# Patient Record
Sex: FEMALE | Race: WHITE | NOT HISPANIC OR LATINO | Employment: FULL TIME | URBAN - NONMETROPOLITAN AREA
[De-identification: names, ages, dates, MRNs, and addresses within clinical notes are randomized per-mention and may not be internally consistent; named-entity substitution may affect disease eponyms.]

---

## 2017-04-12 ENCOUNTER — OFFICE VISIT (OUTPATIENT)
Dept: CARDIOLOGY | Facility: CLINIC | Age: 32
End: 2017-04-12

## 2017-04-12 VITALS
DIASTOLIC BLOOD PRESSURE: 78 MMHG | HEIGHT: 63 IN | OXYGEN SATURATION: 100 % | WEIGHT: 130.8 LBS | HEART RATE: 107 BPM | BODY MASS INDEX: 23.18 KG/M2 | SYSTOLIC BLOOD PRESSURE: 121 MMHG

## 2017-04-12 DIAGNOSIS — R00.0 TACHYCARDIA: ICD-10-CM

## 2017-04-12 DIAGNOSIS — R42 DIZZINESS: ICD-10-CM

## 2017-04-12 DIAGNOSIS — R06.02 SHORTNESS OF BREATH: Primary | ICD-10-CM

## 2017-04-12 PROCEDURE — 99204 OFFICE O/P NEW MOD 45 MIN: CPT | Performed by: PHYSICIAN ASSISTANT

## 2017-04-12 RX ORDER — NORGESTIMATE AND ETHINYL ESTRADIOL 0.25-0.035
1 KIT ORAL DAILY
COMMUNITY

## 2017-04-12 NOTE — PATIENT INSTRUCTIONS
Postural Orthostatic Tachycardia Syndrome  Postural orthostatic tachycardia syndrome (POTS) is an increased heart rate when going from a lying (supine) position to a standing position. The heart rate may increase more than 30 beats per minute (BPM) above its resting rate when going from a lying to a standing position. POTS occurs more frequently in women than in men.   SYMPTOMS   POTS symptoms may be increased in the morning. Symptoms of POTS include:  · Fainting or near fainting.  · Inability to think clearly.  · Extreme or chronic fatigue.  · Exercise intolerance.  · Chest pain.  · Having the lower legs develop a reddish-blue color due to decreased blood flow (acrocyanosis).  CAUSES  POTS can be caused by different conditions. Sometimes, it has no known cause (idiopathic). Some causes of POTS include:  · Viral illness.  · Pregnancy.  · Autoimmune diseases.  · Medications.  · Major surgery.  · Trauma such as a car accident or major injury.  · Medical conditions such as anemia, dehydration, and hyperthyroidism.  DIAGNOSIS   POTS is diagnosed by:  · Taking a complete history and physical exam.  · Measuring the heart rate while lying and then upon standing.  · Measuring blood pressure when going from a lying to a standing position. POTS is usually not associated with low blood pressure (orthostatic hypotension) when going from a lying to standing position. While standing, blood pressure should be taken 2, 5, and 10 minutes after getting up.  TREATMENT   Treatment of POTS depends upon the severity of the symptoms. Treatment includes:  · Drinking plenty of fluids to avoid getting dehydrated.  · Avoiding very hot environments to not get overheated.  · Increasing your dietary salt intake as instructed by your caregiver.  · Taking different types of medications as prescribed for POTS.  · Avoiding some classes of medications such as vasodilators and diuretics.  SEEK IMMEDIATE MEDICAL CARE IF  · You have severe chest pain  that does not go away. Call your local emergency service immediately.  · You feel your heart racing or beating rapidly.  · You feel like passing out.  · You have very confused thinking.  MAKE SURE YOU  · Understand these instructions.  · Will watch your condition.  · Will get help right away if you are not doing well or get worse.     This information is not intended to replace advice given to you by your health care provider. Make sure you discuss any questions you have with your health care provider.     Document Released: 12/08/2003 Document Revised: 01/08/2016 Document Reviewed: 07/01/2016  Elsevier Interactive Patient Education ©2016 [employer]

## 2017-04-12 NOTE — PROGRESS NOTES
Subjective   Darlene Chanel is a 32 y.o. female     Chief Complaint   Patient presents with   • Palpitations     presents as a new patient    • Edema   • Fatigue       HPI    Patient is a 32-year-old female that presents to our office being referred in the setting of tachycardia, palpitations, and dizziness. She has no history of coronary artery disease, congenital heart disease, or structural heart disease    Patient states that she has been noticing episodes of dizziness and tachycardia when she becomes active. She describes that she will be sitting in upon standing she will occasionally get dizzy as well as having a tachycardic sensation. She has noted her heart rates to jump to 130s to 140s beats per minute. She also describes fatigue, anergy, and weakness.    She does not describe any significant chest pain. She does get mildly dyspneic at times but nothing that has been progressive. She denies PND orthopnea. She denies presyncope or syncope. Otherwise she states she is doing well       Current Outpatient Prescriptions   Medication Sig Dispense Refill   • norgestimate-ethinyl estradiol (SPRINTEC 28) 0.25-35 MG-MCG per tablet Take 1 package by mouth Daily.     • sertraline (ZOLOFT) 50 MG tablet Take 50 mg by mouth Daily.       No current facility-administered medications for this visit.        Review of patient's allergies indicates no known allergies.    History reviewed. No pertinent past medical history.    Social History     Social History   • Marital status:      Spouse name: N/A   • Number of children: N/A   • Years of education: N/A     Occupational History   • Not on file.     Social History Main Topics   • Smoking status: Former Smoker   • Smokeless tobacco: Never Used   • Alcohol use No   • Drug use: No   • Sexual activity: Defer     Other Topics Concern   • Not on file     Social History Narrative   • No narrative on file       @[facility]@    Review of Systems   Constitutional: Positive for  "fatigue.   HENT: Positive for sinus pressure.    Eyes: Negative.    Respiratory: Positive for shortness of breath.    Cardiovascular: Positive for palpitations and leg swelling. Negative for chest pain.   Gastrointestinal: Negative.    Endocrine: Negative.    Genitourinary: Negative.    Musculoskeletal: Negative.    Skin: Negative.    Allergic/Immunologic: Positive for environmental allergies.   Neurological: Positive for light-headedness and headaches.   Hematological: Bruises/bleeds easily.   Psychiatric/Behavioral: The patient is nervous/anxious.        Objective     /78 (BP Location: Left arm, Patient Position: Standing)  Pulse 107  Ht 63\" (160 cm)  Wt 130 lb 12.8 oz (59.3 kg)  SpO2 100%  BMI 23.17 kg/m2    Lab Results (most recent)     None          Physical Exam   Constitutional: She is oriented to person, place, and time. She appears well-developed and well-nourished. No distress.   HENT:   Head: Normocephalic and atraumatic.   Eyes: EOM are normal. Pupils are equal, round, and reactive to light.   Neck: No JVD present.   Cardiovascular: Normal rate, regular rhythm and normal heart sounds.  Exam reveals no gallop and no friction rub.    No murmur heard.  Pulmonary/Chest: Effort normal and breath sounds normal. No respiratory distress. She has no wheezes. She has no rales. She exhibits no tenderness.   Abdominal: Soft.   Musculoskeletal: Normal range of motion. She exhibits no edema.   Neurological: She is alert and oriented to person, place, and time. No cranial nerve deficit.   Skin: Skin is warm and dry. No rash noted. No erythema. No pallor.   Psychiatric: She has a normal mood and affect. Her behavior is normal.   Nursing note and vitals reviewed.      Procedure   Procedures         Assessment/Plan     Problems Addressed this Visit        Cardiovascular and Mediastinum    Tachycardia    Relevant Orders    Treadmill Stress Test    Adult Transthoracic Echo Complete    Holter Monitor - 24 Hour    "    Respiratory    Shortness of breath - Primary    Relevant Orders    Treadmill Stress Test    Adult Transthoracic Echo Complete    Holter Monitor - 24 Hour       Other    Dizziness    Relevant Orders    Treadmill Stress Test    Adult Transthoracic Echo Complete    Holter Monitor - 24 Hour                Recommendationa  1. My initial impression of the patient is that she may have pots syndrome versus inappropriate sinus tachycardia. Orthostatic blood pressures do not suggest a significant drop although she does become more tachycardic. We would like to perform a basic routine cardiac workup. Stress test to look for any arrhythmias as well as his for an ischemia assessment. Echocardiogram to evaluate structural integrity of the heart as well as cardiac function. We will like to obtain a 24-hour Holter monitor to rule out any significant arrhythmias. Upon follow-up, if above is unremarkable, we may consider adding of a low-dose beta-blockade to help with her tachycardic sensations. Otherwise we will see back for follow-up of above testing. Follow-up primary as scheduled

## 2017-04-18 ENCOUNTER — OUTSIDE FACILITY SERVICE (OUTPATIENT)
Dept: CARDIOLOGY | Facility: CLINIC | Age: 32
End: 2017-04-18

## 2017-04-18 PROCEDURE — 93227 XTRNL ECG REC<48 HR R&I: CPT | Performed by: INTERNAL MEDICINE

## 2017-05-04 ENCOUNTER — OUTSIDE FACILITY SERVICE (OUTPATIENT)
Dept: CARDIOLOGY | Facility: CLINIC | Age: 32
End: 2017-05-04

## 2017-05-04 ENCOUNTER — HOSPITAL ENCOUNTER (OUTPATIENT)
Dept: CARDIOLOGY | Facility: HOSPITAL | Age: 32
Discharge: HOME | End: 2017-05-04
Admitting: PHYSICIAN ASSISTANT

## 2017-05-04 ENCOUNTER — HOSPITAL ENCOUNTER (OUTPATIENT)
Dept: CARDIOLOGY | Facility: HOSPITAL | Age: 32
Discharge: HOME | End: 2017-05-04

## 2017-05-04 LAB
MAXIMAL PREDICTED HEART RATE: 190 BPM
STRESS TARGET HR: 162 BPM

## 2017-05-04 PROCEDURE — 93306 TTE W/DOPPLER COMPLETE: CPT | Performed by: INTERNAL MEDICINE

## 2017-05-04 PROCEDURE — 93306 TTE W/DOPPLER COMPLETE: CPT

## 2017-05-04 PROCEDURE — 93018 CV STRESS TEST I&R ONLY: CPT | Performed by: INTERNAL MEDICINE

## 2017-05-04 PROCEDURE — 93017 CV STRESS TEST TRACING ONLY: CPT

## 2017-11-02 ENCOUNTER — OFFICE VISIT (OUTPATIENT)
Dept: CARDIOLOGY | Facility: CLINIC | Age: 32
End: 2017-11-02

## 2017-11-02 VITALS
DIASTOLIC BLOOD PRESSURE: 69 MMHG | OXYGEN SATURATION: 99 % | HEART RATE: 67 BPM | SYSTOLIC BLOOD PRESSURE: 107 MMHG | HEIGHT: 63 IN | BODY MASS INDEX: 23.04 KG/M2 | WEIGHT: 130 LBS

## 2017-11-02 DIAGNOSIS — R00.0 TACHYCARDIA: ICD-10-CM

## 2017-11-02 DIAGNOSIS — G90.A POTS (POSTURAL ORTHOSTATIC TACHYCARDIA SYNDROME): ICD-10-CM

## 2017-11-02 DIAGNOSIS — R06.02 SHORTNESS OF BREATH: Primary | ICD-10-CM

## 2017-11-02 PROCEDURE — 99213 OFFICE O/P EST LOW 20 MIN: CPT | Performed by: PHYSICIAN ASSISTANT

## 2017-11-02 RX ORDER — FLUDROCORTISONE ACETATE 0.1 MG/1
0.1 TABLET ORAL DAILY
Qty: 30 TABLET | Refills: 6 | Status: SHIPPED | OUTPATIENT
Start: 2017-11-02 | End: 2017-11-29 | Stop reason: ALTCHOICE

## 2017-11-02 NOTE — PROGRESS NOTES
Problem list     Subjective   Darlene Chanel is a 32 y.o. female     Chief Complaint   Patient presents with   • Rapid Heart Rate     Here for 2-3 mo. echo f/u   • Dizziness       HPI    Problem list  1.  Perez syndrome  2.  Low risk regular treadmill stress test May 2017  3.  Preserved systolic function  4.  Chronic palpitations and tachycardia  4.1 Holter monitor May 2017 demonstrated tachycardia and occasional PVC.    Patient is a 32-year-old female that presents back for follow-up.  She had routine cardiac testing which was unremarkable.  Stress test demonstrated no evidence of ischemia patient exercising 8 minutes on a Igor protocol.  Echocardiogram was normal.  Holter monitor showed tachycardia with occasional PVC.  She has no chest pain or pressure    Patient's complaint appears to be    chronic fatigue as well as palpitations and tachycardia.She describes that time her heart will beat fast that she will feel fatigued.  She describes lightheadedness upon standing at times as well.  She has been hypotensive the last 2 office visits.    She has minimal dyspnea.  Denies PND orthopnea.  Otherwise voices no complaints      Outpatient Encounter Prescriptions as of 11/2/2017   Medication Sig Dispense Refill   • norgestimate-ethinyl estradiol (SPRINTEC 28) 0.25-35 MG-MCG per tablet Take 1 package by mouth Daily.     • sertraline (ZOLOFT) 50 MG tablet Take 25 mg by mouth Daily.     • fludrocortisone 0.1 MG tablet Take 1 tablet by mouth Daily. 30 tablet 6     No facility-administered encounter medications on file as of 11/2/2017.        Review of patient's allergies indicates no known allergies.    History reviewed. No pertinent past medical history.    Social History     Social History   • Marital status:      Spouse name: N/A   • Number of children: N/A   • Years of education: N/A     Occupational History   • Not on file.     Social History Main Topics   • Smoking status: Former Smoker   • Smokeless tobacco:  "Never Used   • Alcohol use No   • Drug use: No   • Sexual activity: Defer     Other Topics Concern   • Not on file     Social History Narrative       Family History   Problem Relation Age of Onset   • Hypertension Mother    • Clotting disorder Mother    • COPD Father    • Emphysema Father        Review of Systems   Constitutional: Fatigue: worse.   HENT: Negative.    Eyes: Positive for visual disturbance (contacts).   Respiratory: Positive for shortness of breath (with palps).    Cardiovascular: Positive for palpitations and leg swelling. Negative for chest pain.   Gastrointestinal: Negative.    Endocrine: Negative.    Genitourinary: Negative.    Musculoskeletal: Positive for myalgias (fibro.).   Skin: Negative.    Allergic/Immunologic: Negative.    Neurological: Positive for dizziness.   Hematological: Bruises/bleeds easily (bruise).   Psychiatric/Behavioral: Negative.        Objective   Vitals:    11/02/17 1109   BP: 107/69   BP Location: Left arm   Patient Position: Sitting   Pulse: 67   SpO2: 99%   Weight: 130 lb (59 kg)   Height: 63\" (160 cm)      /69 (BP Location: Left arm, Patient Position: Sitting)  Pulse 67  Ht 63\" (160 cm)  Wt 130 lb (59 kg)  SpO2 99%  BMI 23.03 kg/m2    Lab Results (most recent)     None          Physical Exam   Constitutional: She is oriented to person, place, and time. She appears well-developed and well-nourished. No distress.   HENT:   Head: Normocephalic and atraumatic.   Eyes: EOM are normal. Pupils are equal, round, and reactive to light.   Neck: No JVD present.   Cardiovascular: Normal rate, regular rhythm and normal heart sounds.  Exam reveals no gallop and no friction rub.    No murmur heard.  Pulmonary/Chest: Effort normal and breath sounds normal. No respiratory distress. She has no wheezes. She has no rales. She exhibits no tenderness.   Abdominal: Soft.   Musculoskeletal: Normal range of motion. She exhibits no edema.   Neurological: She is alert and oriented to " person, place, and time. No cranial nerve deficit.   Skin: Skin is warm and dry. No rash noted. No erythema. No pallor.   Psychiatric: She has a normal mood and affect. Her behavior is normal.   Nursing note and vitals reviewed.      Procedure   Procedures       Assessment/Plan     Problems Addressed this Visit        Cardiovascular and Mediastinum    Tachycardia    POTS (postural orthostatic tachycardia syndrome)       Respiratory    Shortness of breath - Primary              Recommendation  1.  Patient symptoms seem to be related to pots syndrome.  I would like to start her on Florinef at this time to help improve blood pressure.  I would like to see her back in 3-4 weeks.  We may at some point have to put her on small dose beta blocker to control heart rate.  2.  Otherwise cardiac testing is negative.  We'll see her back for follow-up as scheduled.  Follow-up primary as scheduled

## 2017-11-28 ENCOUNTER — TRANSCRIBE ORDERS (OUTPATIENT)
Dept: CARDIOLOGY | Facility: CLINIC | Age: 32
End: 2017-11-28

## 2017-11-28 DIAGNOSIS — R00.0 TACHYCARDIA: Primary | ICD-10-CM

## 2017-11-29 ENCOUNTER — HOSPITAL ENCOUNTER (OUTPATIENT)
Dept: RESPIRATORY THERAPY | Facility: HOSPITAL | Age: 32
Discharge: HOME | End: 2017-11-29
Attending: INTERNAL MEDICINE | Admitting: INTERNAL MEDICINE

## 2017-11-29 ENCOUNTER — OFFICE VISIT (OUTPATIENT)
Dept: CARDIOLOGY | Facility: CLINIC | Age: 32
End: 2017-11-29

## 2017-11-29 VITALS
HEART RATE: 94 BPM | HEIGHT: 63 IN | WEIGHT: 129 LBS | DIASTOLIC BLOOD PRESSURE: 70 MMHG | SYSTOLIC BLOOD PRESSURE: 122 MMHG | OXYGEN SATURATION: 96 % | BODY MASS INDEX: 22.86 KG/M2

## 2017-11-29 DIAGNOSIS — R00.2 PALPITATIONS: ICD-10-CM

## 2017-11-29 DIAGNOSIS — R07.9 CHEST PAIN IN ADULT: ICD-10-CM

## 2017-11-29 DIAGNOSIS — R00.0 TACHYCARDIA: Primary | ICD-10-CM

## 2017-11-29 PROCEDURE — 99204 OFFICE O/P NEW MOD 45 MIN: CPT | Performed by: INTERNAL MEDICINE

## 2017-11-29 PROCEDURE — 93226 XTRNL ECG REC<48 HR SCAN A/R: CPT

## 2017-11-29 PROCEDURE — 93225 XTRNL ECG REC<48 HRS REC: CPT

## 2017-11-29 RX ORDER — ONDANSETRON 4 MG/1
4 TABLET, FILM COATED ORAL EVERY 8 HOURS PRN
COMMUNITY
End: 2017-11-29 | Stop reason: ALTCHOICE

## 2017-11-29 RX ORDER — TRETINOIN 0.5 MG/G
1 CREAM TOPICAL NIGHTLY
COMMUNITY
End: 2017-11-29

## 2017-11-29 RX ORDER — METOPROLOL TARTRATE 75 MG/1
25 TABLET, FILM COATED ORAL 2 TIMES DAILY
COMMUNITY
End: 2017-12-14

## 2017-11-29 RX ORDER — FUROSEMIDE 20 MG/1
20 TABLET ORAL 2 TIMES DAILY
COMMUNITY

## 2017-12-01 PROCEDURE — 93227 XTRNL ECG REC<48 HR R&I: CPT | Performed by: INTERNAL MEDICINE

## 2017-12-05 ENCOUNTER — TELEPHONE (OUTPATIENT)
Dept: CARDIOLOGY | Facility: CLINIC | Age: 32
End: 2017-12-05

## 2017-12-05 NOTE — TELEPHONE ENCOUNTER
MD Umu Pepper MA                   Holter monitor is good except slightly fast heartbeat.  Keep the appointment.  We will discuss further

## 2017-12-14 ENCOUNTER — OFFICE VISIT (OUTPATIENT)
Dept: CARDIOLOGY | Facility: CLINIC | Age: 32
End: 2017-12-14

## 2017-12-14 VITALS
HEIGHT: 63 IN | OXYGEN SATURATION: 99 % | DIASTOLIC BLOOD PRESSURE: 62 MMHG | SYSTOLIC BLOOD PRESSURE: 118 MMHG | WEIGHT: 132.4 LBS | HEART RATE: 92 BPM | BODY MASS INDEX: 23.46 KG/M2

## 2017-12-14 DIAGNOSIS — R00.2 PALPITATIONS: Primary | ICD-10-CM

## 2017-12-14 DIAGNOSIS — R00.0 TACHYCARDIA: ICD-10-CM

## 2017-12-14 PROCEDURE — 99213 OFFICE O/P EST LOW 20 MIN: CPT | Performed by: INTERNAL MEDICINE

## 2017-12-14 NOTE — PROGRESS NOTES
subjective     Chief Complaint   Patient presents with   • Follow-up   • Rapid Heart Rate   • Palpitations   • Shortness of Breath     History of Present Illness  Patient is 32 years old white female who was seen by me because of atypical chest pain and fast heartbeat.  Patient had a stress test done which was negative for significant exercise-induced myocardial ischemia.  Echocardiogram was normal.  She also had a Holter monitor which did not show any significant arrhythmia.    Patient is here for follow-up and discussion of the results.  Currently patient has been doing very well has had no chest pain.  She is taking metoprolol 12.5 twice a day    Past Surgical History:   Procedure Laterality Date   • EXCESSIVE THIGH / HIP / BUTTOCK / FLANK SKIN EXCISION     • WISDOM TOOTH EXTRACTION       Family History   Problem Relation Age of Onset   • Hypertension Mother    • Clotting disorder Mother    • COPD Father    • Emphysema Father    • Lung cancer Maternal Grandmother      Past Medical History:   Diagnosis Date   • Anxiety    • Chest pain    • Dizziness    • Fatigue    • Leg swelling    • Tachycardia      Patient Active Problem List   Diagnosis   • Shortness of breath   • Tachycardia   • Dizziness   • POTS (postural orthostatic tachycardia syndrome)   • Chest pain in adult   • Palpitations       Social History   Substance Use Topics   • Smoking status: Former Smoker   • Smokeless tobacco: Never Used   • Alcohol use No       No Known Allergies    Current Outpatient Prescriptions on File Prior to Visit   Medication Sig   • furosemide (LASIX) 20 MG tablet Take 20 mg by mouth 2 (Two) Times a Day.   • norgestimate-ethinyl estradiol (SPRINTEC 28) 0.25-35 MG-MCG per tablet Take 1 package by mouth Daily.   • sertraline (ZOLOFT) 50 MG tablet Take 25 mg by mouth Daily.     No current facility-administered medications on file prior to visit.          The following portions of the patient's history were reviewed and updated as  "appropriate: allergies, current medications, past family history, past medical history, past social history, past surgical history and problem list.    Review of Systems   Constitution: Negative.   HENT: Negative.  Negative for congestion.    Eyes: Negative.    Cardiovascular: Negative.  Negative for chest pain, cyanosis, dyspnea on exertion, irregular heartbeat, leg swelling, near-syncope, orthopnea, palpitations, paroxysmal nocturnal dyspnea and syncope.   Respiratory: Negative.  Negative for shortness of breath.    Hematologic/Lymphatic: Negative.    Musculoskeletal: Negative.    Gastrointestinal: Negative.    Neurological: Negative.  Negative for headaches.          Objective:     /62 (BP Location: Left arm, Patient Position: Sitting, Cuff Size: Adult)  Pulse 92  Ht 160 cm (63\")  Wt 60.1 kg (132 lb 6.4 oz)  SpO2 99%  BMI 23.45 kg/m2  Physical Exam   Constitutional: She appears well-developed and well-nourished. No distress.   HENT:   Head: Normocephalic and atraumatic.   Mouth/Throat: Oropharynx is clear and moist. No oropharyngeal exudate.   Eyes: Conjunctivae and EOM are normal. Pupils are equal, round, and reactive to light. No scleral icterus.   Neck: Normal range of motion. Neck supple. No JVD present. No tracheal deviation present. No thyromegaly present.   Cardiovascular: Normal rate, regular rhythm, normal heart sounds and intact distal pulses.  PMI is not displaced.  Exam reveals no gallop, no friction rub and no decreased pulses.    No murmur heard.  Pulses:       Carotid pulses are 3+ on the right side, and 3+ on the left side.       Radial pulses are 3+ on the right side, and 3+ on the left side.   Pulmonary/Chest: Effort normal and breath sounds normal. No respiratory distress. She has no wheezes. She has no rales. She exhibits no tenderness.   Abdominal: Soft. Bowel sounds are normal. She exhibits no distension, no abdominal bruit and no mass. There is no splenomegaly or hepatomegaly. " There is no tenderness. There is no rebound and no guarding.   Musculoskeletal: Normal range of motion. She exhibits no edema, tenderness or deformity.   Lymphadenopathy:     She has no cervical adenopathy.   Neurological: She is alert. She has normal reflexes. No cranial nerve deficit. She exhibits normal muscle tone. Coordination normal.   Skin: Skin is warm and dry. No rash noted. She is not diaphoretic. No erythema.   Psychiatric: She has a normal mood and affect. Her behavior is normal. Judgment and thought content normal.         Procedures     Echo, Holter and stress test reviewed      Assessment:     1. Palpitations    2. Tachycardia          Plan:      Patient was advised to increase Lopressor 25 twice a day.  He'll continue rest of her medications.  Follow-up scheduled.        Return in about 3 months (around 3/14/2018).

## 2018-06-29 ENCOUNTER — TRANSCRIBE ORDERS (OUTPATIENT)
Dept: LAB | Facility: HOSPITAL | Age: 33
End: 2018-06-29

## 2018-06-29 ENCOUNTER — LAB (OUTPATIENT)
Dept: LAB | Facility: HOSPITAL | Age: 33
End: 2018-06-29

## 2018-06-29 DIAGNOSIS — Z30.09 GENERAL COUNSELING FOR PRESCRIPTION OF ORAL CONTRACEPTIVES: ICD-10-CM

## 2018-06-29 DIAGNOSIS — N92.5 RETROGRADE MENSTRUATION: Primary | ICD-10-CM

## 2018-06-29 DIAGNOSIS — N92.5 RETROGRADE MENSTRUATION: ICD-10-CM

## 2018-06-29 LAB
HCG INTACT+B SERPL-ACNC: <5 MIU/ML
PROGEST SERPL-MCNC: 18.25 NG/ML

## 2018-06-29 PROCEDURE — 84144 ASSAY OF PROGESTERONE: CPT

## 2018-06-29 PROCEDURE — 84702 CHORIONIC GONADOTROPIN TEST: CPT

## 2018-06-29 PROCEDURE — 36415 COLL VENOUS BLD VENIPUNCTURE: CPT

## 2021-03-23 ENCOUNTER — BULK ORDERING (OUTPATIENT)
Dept: CASE MANAGEMENT | Facility: OTHER | Age: 36
End: 2021-03-23

## 2021-03-23 DIAGNOSIS — Z23 IMMUNIZATION DUE: ICD-10-CM

## 2024-09-23 LAB
MAXIMAL PREDICTED HEART RATE: 190 BPM
STRESS TARGET HR: 162 BPM

## 2024-11-13 ENCOUNTER — OUTSIDE FACILITY SERVICE (OUTPATIENT)
Dept: CARDIOLOGY | Facility: CLINIC | Age: 39
End: 2024-11-13
Payer: COMMERCIAL

## 2024-11-13 PROCEDURE — 93248 EXT ECG>7D<15D REV&INTERPJ: CPT | Performed by: INTERNAL MEDICINE
